# Patient Record
Sex: MALE | Race: WHITE | Employment: UNEMPLOYED | URBAN - METROPOLITAN AREA
[De-identification: names, ages, dates, MRNs, and addresses within clinical notes are randomized per-mention and may not be internally consistent; named-entity substitution may affect disease eponyms.]

---

## 2021-03-10 ENCOUNTER — HOSPITAL ENCOUNTER (OUTPATIENT)
Age: 31
Setting detail: OBSERVATION
Discharge: HOME | End: 2021-03-11
Attending: EMERGENCY MEDICINE | Admitting: EMERGENCY MEDICINE
Payer: COMMERCIAL

## 2021-03-10 DIAGNOSIS — K85.90 ACUTE PANCREATITIS WITHOUT INFECTION OR NECROSIS, UNSPECIFIED PANCREATITIS TYPE: Primary | ICD-10-CM

## 2021-03-10 LAB
ABSOLUTE EOS #: 0 K/UL (ref 0–0.4)
ABSOLUTE IMMATURE GRANULOCYTE: 0.09 K/UL (ref 0–0.3)
ABSOLUTE LYMPH #: 3.51 K/UL (ref 1–4.8)
ABSOLUTE MONO #: 0.63 K/UL (ref 0.1–0.8)
ANION GAP SERPL CALCULATED.3IONS-SCNC: 10 MMOL/L (ref 9–17)
BASOPHILS # BLD: 2 % (ref 0–2)
BASOPHILS ABSOLUTE: 0.18 K/UL (ref 0–0.2)
BUN BLDV-MCNC: 12 MG/DL (ref 6–20)
BUN/CREAT BLD: ABNORMAL (ref 9–20)
CALCIUM SERPL-MCNC: 8.5 MG/DL (ref 8.6–10.4)
CHLORIDE BLD-SCNC: 107 MMOL/L (ref 98–107)
CO2: 24 MMOL/L (ref 20–31)
CREAT SERPL-MCNC: 0.62 MG/DL (ref 0.7–1.2)
DIFFERENTIAL TYPE: ABNORMAL
EOSINOPHILS RELATIVE PERCENT: 0 % (ref 1–4)
GFR AFRICAN AMERICAN: >60 ML/MIN
GFR NON-AFRICAN AMERICAN: >60 ML/MIN
GFR SERPL CREATININE-BSD FRML MDRD: ABNORMAL ML/MIN/{1.73_M2}
GFR SERPL CREATININE-BSD FRML MDRD: ABNORMAL ML/MIN/{1.73_M2}
GLUCOSE BLD-MCNC: 85 MG/DL (ref 70–99)
HCT VFR BLD CALC: 36 % (ref 40.7–50.3)
HEMOGLOBIN: 11.2 G/DL (ref 13–17)
IMMATURE GRANULOCYTES: 1 %
LIPASE: 183 U/L (ref 13–60)
LYMPHOCYTES # BLD: 39 % (ref 24–44)
MCH RBC QN AUTO: 19.8 PG (ref 25.2–33.5)
MCHC RBC AUTO-ENTMCNC: 31.1 G/DL (ref 28.4–34.8)
MCV RBC AUTO: 63.5 FL (ref 82.6–102.9)
MONOCYTES # BLD: 7 % (ref 1–7)
MORPHOLOGY: ABNORMAL
NRBC AUTOMATED: 0.2 PER 100 WBC
PDW BLD-RTO: 19.4 % (ref 11.8–14.4)
PLATELET # BLD: ABNORMAL K/UL (ref 138–453)
PLATELET ESTIMATE: ABNORMAL
PLATELET, FLUORESCENCE: 307 K/UL (ref 138–453)
PLATELET, IMMATURE FRACTION: 7.6 % (ref 1.1–10.3)
PMV BLD AUTO: ABNORMAL FL (ref 8.1–13.5)
POTASSIUM SERPL-SCNC: 3.8 MMOL/L (ref 3.7–5.3)
RBC # BLD: 5.67 M/UL (ref 4.21–5.77)
RBC # BLD: ABNORMAL 10*6/UL
SEG NEUTROPHILS: 51 % (ref 36–66)
SEGMENTED NEUTROPHILS ABSOLUTE COUNT: 4.59 K/UL (ref 1.8–7.7)
SODIUM BLD-SCNC: 141 MMOL/L (ref 135–144)
WBC # BLD: 9 K/UL (ref 3.5–11.3)
WBC # BLD: ABNORMAL 10*3/UL

## 2021-03-10 PROCEDURE — 85055 RETICULATED PLATELET ASSAY: CPT

## 2021-03-10 PROCEDURE — 83690 ASSAY OF LIPASE: CPT

## 2021-03-10 PROCEDURE — 6360000002 HC RX W HCPCS: Performed by: STUDENT IN AN ORGANIZED HEALTH CARE EDUCATION/TRAINING PROGRAM

## 2021-03-10 PROCEDURE — 2580000003 HC RX 258: Performed by: STUDENT IN AN ORGANIZED HEALTH CARE EDUCATION/TRAINING PROGRAM

## 2021-03-10 PROCEDURE — 85025 COMPLETE CBC W/AUTO DIFF WBC: CPT

## 2021-03-10 PROCEDURE — 96374 THER/PROPH/DIAG INJ IV PUSH: CPT

## 2021-03-10 PROCEDURE — 80048 BASIC METABOLIC PNL TOTAL CA: CPT

## 2021-03-10 PROCEDURE — 96375 TX/PRO/DX INJ NEW DRUG ADDON: CPT

## 2021-03-10 PROCEDURE — 99283 EMERGENCY DEPT VISIT LOW MDM: CPT

## 2021-03-10 RX ORDER — KETOROLAC TROMETHAMINE 30 MG/ML
30 INJECTION, SOLUTION INTRAMUSCULAR; INTRAVENOUS ONCE
Status: COMPLETED | OUTPATIENT
Start: 2021-03-10 | End: 2021-03-10

## 2021-03-10 RX ORDER — 0.9 % SODIUM CHLORIDE 0.9 %
1000 INTRAVENOUS SOLUTION INTRAVENOUS ONCE
Status: COMPLETED | OUTPATIENT
Start: 2021-03-10 | End: 2021-03-10

## 2021-03-10 RX ORDER — 0.9 % SODIUM CHLORIDE 0.9 %
1000 INTRAVENOUS SOLUTION INTRAVENOUS ONCE
Status: COMPLETED | OUTPATIENT
Start: 2021-03-10 | End: 2021-03-11

## 2021-03-10 RX ORDER — ONDANSETRON 2 MG/ML
4 INJECTION INTRAMUSCULAR; INTRAVENOUS ONCE
Status: COMPLETED | OUTPATIENT
Start: 2021-03-10 | End: 2021-03-10

## 2021-03-10 RX ADMIN — SODIUM CHLORIDE 1000 ML: 9 INJECTION, SOLUTION INTRAVENOUS at 22:26

## 2021-03-10 RX ADMIN — SODIUM CHLORIDE 1000 ML: 9 INJECTION, SOLUTION INTRAVENOUS at 23:59

## 2021-03-10 RX ADMIN — ONDANSETRON 4 MG: 2 INJECTION INTRAMUSCULAR; INTRAVENOUS at 22:19

## 2021-03-10 RX ADMIN — KETOROLAC TROMETHAMINE 30 MG: 30 INJECTION, SOLUTION INTRAMUSCULAR; INTRAVENOUS at 22:19

## 2021-03-10 ASSESSMENT — PAIN SCALES - GENERAL: PAINLEVEL_OUTOF10: 7

## 2021-03-11 VITALS
TEMPERATURE: 98.4 F | RESPIRATION RATE: 16 BRPM | WEIGHT: 180 LBS | HEART RATE: 59 BPM | OXYGEN SATURATION: 100 % | SYSTOLIC BLOOD PRESSURE: 103 MMHG | DIASTOLIC BLOOD PRESSURE: 64 MMHG | BODY MASS INDEX: 23.86 KG/M2 | HEIGHT: 73 IN

## 2021-03-11 LAB
ABSOLUTE EOS #: 0.17 K/UL (ref 0–0.44)
ABSOLUTE IMMATURE GRANULOCYTE: 0.17 K/UL (ref 0–0.3)
ABSOLUTE LYMPH #: 2.44 K/UL (ref 1.1–3.7)
ABSOLUTE MONO #: 0.87 K/UL (ref 0.1–1.2)
ALBUMIN SERPL-MCNC: 3.7 G/DL (ref 3.5–5.2)
ALBUMIN SERPL-MCNC: 4.3 G/DL (ref 3.5–5.2)
ALBUMIN/GLOBULIN RATIO: 1.7 (ref 1–2.5)
ALBUMIN/GLOBULIN RATIO: 1.7 (ref 1–2.5)
ALP BLD-CCNC: 56 U/L (ref 40–129)
ALP BLD-CCNC: 67 U/L (ref 40–129)
ALT SERPL-CCNC: 60 U/L (ref 5–41)
ALT SERPL-CCNC: 70 U/L (ref 5–41)
ANION GAP SERPL CALCULATED.3IONS-SCNC: 9 MMOL/L (ref 9–17)
AST SERPL-CCNC: 86 U/L
AST SERPL-CCNC: 88 U/L
BASOPHILS # BLD: 1 % (ref 0–2)
BASOPHILS ABSOLUTE: 0.09 K/UL (ref 0–0.2)
BILIRUB SERPL-MCNC: 0.5 MG/DL (ref 0.3–1.2)
BILIRUB SERPL-MCNC: 1.07 MG/DL (ref 0.3–1.2)
BILIRUBIN DIRECT: 0.15 MG/DL
BILIRUBIN, INDIRECT: 0.35 MG/DL (ref 0–1)
BUN BLDV-MCNC: 10 MG/DL (ref 6–20)
BUN/CREAT BLD: ABNORMAL (ref 9–20)
CALCIUM SERPL-MCNC: 8.6 MG/DL (ref 8.6–10.4)
CHLORIDE BLD-SCNC: 105 MMOL/L (ref 98–107)
CO2: 22 MMOL/L (ref 20–31)
CREAT SERPL-MCNC: 0.58 MG/DL (ref 0.7–1.2)
DIFFERENTIAL TYPE: ABNORMAL
EOSINOPHILS RELATIVE PERCENT: 2 % (ref 1–4)
GFR AFRICAN AMERICAN: >60 ML/MIN
GFR NON-AFRICAN AMERICAN: >60 ML/MIN
GFR SERPL CREATININE-BSD FRML MDRD: ABNORMAL ML/MIN/{1.73_M2}
GFR SERPL CREATININE-BSD FRML MDRD: ABNORMAL ML/MIN/{1.73_M2}
GLOBULIN: ABNORMAL G/DL (ref 1.5–3.8)
GLUCOSE BLD-MCNC: 110 MG/DL (ref 70–99)
HCT VFR BLD CALC: 39.5 % (ref 40.7–50.3)
HEMOGLOBIN: 12 G/DL (ref 13–17)
IMMATURE GRANULOCYTES: 2 %
LIPASE: 36 U/L (ref 13–60)
LYMPHOCYTES # BLD: 28 % (ref 24–43)
MCH RBC QN AUTO: 19.4 PG (ref 25.2–33.5)
MCHC RBC AUTO-ENTMCNC: 30.4 G/DL (ref 28.4–34.8)
MCV RBC AUTO: 64 FL (ref 82.6–102.9)
MONOCYTES # BLD: 10 % (ref 3–12)
MORPHOLOGY: ABNORMAL
NRBC AUTOMATED: 0 PER 100 WBC
PDW BLD-RTO: 20.3 % (ref 11.8–14.4)
PLATELET # BLD: ABNORMAL K/UL (ref 138–453)
PLATELET ESTIMATE: ABNORMAL
PLATELET, FLUORESCENCE: 300 K/UL (ref 138–453)
PLATELET, IMMATURE FRACTION: 7.2 % (ref 1.1–10.3)
PMV BLD AUTO: ABNORMAL FL (ref 8.1–13.5)
POTASSIUM SERPL-SCNC: 3.9 MMOL/L (ref 3.7–5.3)
RBC # BLD: 6.17 M/UL (ref 4.21–5.77)
RBC # BLD: ABNORMAL 10*6/UL
SEG NEUTROPHILS: 57 % (ref 36–65)
SEGMENTED NEUTROPHILS ABSOLUTE COUNT: 4.96 K/UL (ref 1.5–8.1)
SODIUM BLD-SCNC: 136 MMOL/L (ref 135–144)
TOTAL PROTEIN: 5.9 G/DL (ref 6.4–8.3)
TOTAL PROTEIN: 6.9 G/DL (ref 6.4–8.3)
WBC # BLD: 8.7 K/UL (ref 3.5–11.3)
WBC # BLD: ABNORMAL 10*3/UL

## 2021-03-11 PROCEDURE — 2580000003 HC RX 258: Performed by: STUDENT IN AN ORGANIZED HEALTH CARE EDUCATION/TRAINING PROGRAM

## 2021-03-11 PROCEDURE — 83690 ASSAY OF LIPASE: CPT

## 2021-03-11 PROCEDURE — 36415 COLL VENOUS BLD VENIPUNCTURE: CPT

## 2021-03-11 PROCEDURE — 6360000002 HC RX W HCPCS: Performed by: STUDENT IN AN ORGANIZED HEALTH CARE EDUCATION/TRAINING PROGRAM

## 2021-03-11 PROCEDURE — 80053 COMPREHEN METABOLIC PANEL: CPT

## 2021-03-11 PROCEDURE — 85055 RETICULATED PLATELET ASSAY: CPT

## 2021-03-11 PROCEDURE — 80076 HEPATIC FUNCTION PANEL: CPT

## 2021-03-11 PROCEDURE — G0378 HOSPITAL OBSERVATION PER HR: HCPCS

## 2021-03-11 PROCEDURE — 96376 TX/PRO/DX INJ SAME DRUG ADON: CPT

## 2021-03-11 PROCEDURE — 85025 COMPLETE CBC W/AUTO DIFF WBC: CPT

## 2021-03-11 RX ORDER — OXYCODONE HYDROCHLORIDE 5 MG/1
10 TABLET ORAL EVERY 6 HOURS PRN
Status: DISCONTINUED | OUTPATIENT
Start: 2021-03-11 | End: 2021-03-11 | Stop reason: HOSPADM

## 2021-03-11 RX ORDER — ONDANSETRON 4 MG/1
4 TABLET, ORALLY DISINTEGRATING ORAL EVERY 8 HOURS PRN
Qty: 15 TABLET | Refills: 0 | Status: SHIPPED | OUTPATIENT
Start: 2021-03-11 | End: 2022-01-12 | Stop reason: SDUPTHER

## 2021-03-11 RX ORDER — ONDANSETRON 2 MG/ML
4 INJECTION INTRAMUSCULAR; INTRAVENOUS EVERY 6 HOURS PRN
Status: DISCONTINUED | OUTPATIENT
Start: 2021-03-11 | End: 2021-03-11 | Stop reason: HOSPADM

## 2021-03-11 RX ORDER — MORPHINE SULFATE 2 MG/ML
2 INJECTION, SOLUTION INTRAMUSCULAR; INTRAVENOUS EVERY 4 HOURS PRN
Status: DISCONTINUED | OUTPATIENT
Start: 2021-03-11 | End: 2021-03-11 | Stop reason: HOSPADM

## 2021-03-11 RX ORDER — HYDROCODONE BITARTRATE AND ACETAMINOPHEN 5; 325 MG/1; MG/1
1 TABLET ORAL EVERY 6 HOURS PRN
Qty: 10 TABLET | Refills: 0 | Status: SHIPPED | OUTPATIENT
Start: 2021-03-11 | End: 2021-03-14

## 2021-03-11 RX ORDER — SODIUM CHLORIDE 0.9 % (FLUSH) 0.9 %
10 SYRINGE (ML) INJECTION EVERY 12 HOURS SCHEDULED
Status: DISCONTINUED | OUTPATIENT
Start: 2021-03-11 | End: 2021-03-11 | Stop reason: HOSPADM

## 2021-03-11 RX ORDER — OXYCODONE HYDROCHLORIDE 5 MG/1
5 TABLET ORAL EVERY 6 HOURS PRN
Status: DISCONTINUED | OUTPATIENT
Start: 2021-03-11 | End: 2021-03-11 | Stop reason: HOSPADM

## 2021-03-11 RX ORDER — SODIUM CHLORIDE 0.9 % (FLUSH) 0.9 %
10 SYRINGE (ML) INJECTION PRN
Status: DISCONTINUED | OUTPATIENT
Start: 2021-03-11 | End: 2021-03-11 | Stop reason: HOSPADM

## 2021-03-11 RX ORDER — ACETAMINOPHEN 500 MG
1000 TABLET ORAL EVERY 8 HOURS SCHEDULED
Status: DISCONTINUED | OUTPATIENT
Start: 2021-03-11 | End: 2021-03-11 | Stop reason: HOSPADM

## 2021-03-11 RX ADMIN — ONDANSETRON 4 MG: 2 INJECTION INTRAMUSCULAR; INTRAVENOUS at 14:33

## 2021-03-11 RX ADMIN — SODIUM CHLORIDE, POTASSIUM CHLORIDE, SODIUM LACTATE AND CALCIUM CHLORIDE: 600; 310; 30; 20 INJECTION, SOLUTION INTRAVENOUS at 01:24

## 2021-03-11 RX ADMIN — ONDANSETRON 4 MG: 2 INJECTION INTRAMUSCULAR; INTRAVENOUS at 07:30

## 2021-03-11 ASSESSMENT — ENCOUNTER SYMPTOMS
VOMITING: 1
CONSTIPATION: 0
NAUSEA: 1
BACK PAIN: 0
ABDOMINAL PAIN: 0
SORE THROAT: 0
RHINORRHEA: 0
COUGH: 0
SHORTNESS OF BREATH: 0
DIARRHEA: 0

## 2021-03-11 ASSESSMENT — PAIN SCALES - GENERAL: PAINLEVEL_OUTOF10: 0

## 2021-03-11 NOTE — PROGRESS NOTES
[address]  CDU / OBSERVATION ENCOUNTER  ATTENDING NOTE       I performed a history and physical examination of the patient and discussed management with the resident or midlevel provider. I reviewed the resident or midlevel provider's note and agree with the documented findings and plan of care. Any areas of disagreement are noted on the chart. I was personally present for the key portions of any procedures. I have documented in the chart those procedures where I was not present during the key portions. I have reviewed the nurses notes. I agree with the chief complaint, past medical history, past surgical history, allergies, medications, social and family history as documented unless otherwise noted below. The Family history, social history, and ROS are effectively unchanged since admission unless noted elsewhere in the chart. Patient improving after mild pancreatitis secondary to alcohol intake. Patient was drinking several nights ago and then began having symptoms. Patient with elevated lipase. Advancing diet slowly today. Will reassess.     Tracie Yin MD  Attending Emergency  Physician

## 2021-03-11 NOTE — H&P
[address]  CDU / OBSERVATION ENCOUNTER  RESIDENT NOTE     Pt Name: Zack Landaverde  MRN: [id number]  [place] 1990  Date of evaluation: 3/11/21  Patient's PCP is : No primary care provider on file. CHIEF COMPLAINT       Chief Complaint   Patient presents with    Nausea     HISTORY OF PRESENT ILLNESS    Zack Landaverde is a 31 y.o. male who presents vomiting and diarrhea x3 days. Lipase 183. He was admitted to observation for symptomatic control. No history of pancreatitis previously. No history of gallstones. Patient did report one night of heavy drinking prior to symptom onset but denies daily alcohol use. Has been able to tolerate fluids overnight. States that diarrhea has resolved this morning. Location/Symptom: Vomiting  Timing/Onset: 3 days ago  Provocation: N/A  Quality: Nonbloody nonbilious  Radiation: N/A  Severity: N/A  Timing/Duration: Improving  Modifying Factors: Antiemetics    REVIEW OF SYSTEMS       General ROS - No fevers, No malaise   Ophthalmic ROS - No discharge, No changes in vision  ENT ROS -  No sore throat, No rhinorrhea,   Respiratory ROS - no shortness of breath, no cough, no  wheezing  Cardiovascular ROS - No chest pain, no dyspnea on exertion  Gastrointestinal ROS -positive for abdominal pain and diarrhea and vomiting  Genito-Urinary ROS - No dysuria, trouble voiding, or hematuria  Musculoskeletal ROS - No myalgias, No arthalgias  Neurological ROS - No headache, no dizziness/lightheadedness, No focal weakness, no loss of sensation  Dermatological ROS - No lesions, No rash     (PQRS) Advance directives on face sheet per hospital policy. No change unless specifically mentioned in chart    PAST MEDICAL HISTORY    has no past medical history on file. I have reviewed the past medical history with the patient and it is not pertinent to this complaint. SURGICAL HISTORY      has no past surgical history on file.   I have reviewed and agree with Surgical History entered and it is not pertinent to this complaint. CURRENT MEDICATIONS     sodium chloride flush 0.9 % injection 10 mL, 2 times per day  sodium chloride flush 0.9 % injection 10 mL, PRN  enoxaparin (LOVENOX) injection 40 mg, Daily  acetaminophen (TYLENOL) tablet 1,000 mg, 3 times per day  morphine (PF) injection 2 mg, Q4H PRN  oxyCODONE (ROXICODONE) immediate release tablet 5 mg, Q6H PRN    Or  oxyCODONE (ROXICODONE) immediate release tablet 10 mg, Q6H PRN  ondansetron (ZOFRAN) injection 4 mg, Q6H PRN  lactated ringers 1,000 mL infusion, Continuous        All medication charted and reviewed. ALLERGIES     has No Known Allergies. FAMILY HISTORY     has no family status information on file. family history is not on file. I have reviewed and agree with the family history entered. I have reviewed the Family History and it is not significant to the case    SOCIAL HISTORY      reports that he has never smoked. He has never used smokeless tobacco.  I have reviewed and agree with all Social.  There are no concerns for substance abuse/use. PHYSICAL EXAM     INITIAL VITALS:  height is 6' 1\" (1.854 m) and weight is 180 lb (81.6 kg). His oral temperature is 98.4 °F (36.9 °C). His blood pressure is 103/64 and his pulse is 59. His respiration is 16 and oxygen saturation is 100%.       CONSTITUTIONAL: AOx4, no apparent distress, appears stated age    HEAD: normocephalic, atraumatic   EYES: PERRLA, EOMI    ENT: moist mucous membranes, uvula midline   NECK: supple, symmetric   BACK: symmetric   LUNGS: clear to auscultation bilaterally   CARDIOVASCULAR: regular rate and rhythm, no murmurs, rubs or gallops   ABDOMEN: soft, non-tender, non-distended with normal active bowel sounds   NEUROLOGIC:  MAEx4, no focal sensory or motor deficits   MUSCULOSKELETAL: no clubbing, cyanosis or edema   SKIN: no rash or wounds       DIAGNOSTIC RESULTS     RADIOLOGY:   I directly visualized the following  images and reviewed the radiologist interpretations:    No results found. LABS:  I have reviewed and interpreted all available lab results. Labs Reviewed   BASIC METABOLIC PANEL W/ REFLEX TO MG FOR LOW K - Abnormal; Notable for the following components:       Result Value    CREATININE 0.62 (*)     Calcium 8.5 (*)     All other components within normal limits   CBC WITH AUTO DIFFERENTIAL - Abnormal; Notable for the following components:    Hemoglobin 11.2 (*)     Hematocrit 36.0 (*)     MCV 63.5 (*)     MCH 19.8 (*)     RDW 19.4 (*)     NRBC Automated 0.2 (*)     Immature Granulocytes 1 (*)     Eosinophils % 0 (*)     All other components within normal limits   LIPASE - Abnormal; Notable for the following components:    Lipase 183 (*)     All other components within normal limits   HEPATIC FUNCTION PANEL - Abnormal; Notable for the following components:    ALT 60 (*)     AST 86 (*)     Total Protein 5.9 (*)     All other components within normal limits   IMMATURE PLATELET FRACTION   COMPREHENSIVE METABOLIC PANEL W/ REFLEX TO MG FOR LOW K   LIPASE   CBC WITH AUTO DIFFERENTIAL       CDU IMPRESSION / PLAN      Quan Jimenez is a 31 y.o. male who presents with :    Pancreatitis:  -Analgesics, fluids, advance diet as tolerated    · Continue home medications and pain control  · Monitor vitals, labs, and imaging  · DISPO: pending consults and clinical improvement    CONSULTS:    None    PROCEDURES:  Not indicated       PATIENT REFERRED TO:    No follow-up provider specified. --  Juice Laureano DO   Emergency Medicine Resident     This dictation was generated by voice recognition computer software. Although all attempts are made to edit the dictation for accuracy, there may be errors in the transcription that are not intended.

## 2021-03-11 NOTE — ED PROVIDER NOTES
[address] [facility]                                      Emergency Department                                      Faculty Attestation                                         I performed a history and physical examination of the patient and discussed management with the resident. I reviewed the residents note and agree with the documented findings and plan of care. Any areas of disagreement are noted on the chart. I was personally present for the key portions of any procedures. I have documented in the chart those procedures where I was not present during the key portions. I agree with the chief complaint, past medical history, past surgical history, allergies, medications, social and family history as documented unless otherwise noted below. For mid-level providers such as nurse practitioners as well as physicians assistants:    I have personally seen and evaluated the patient. I find the patient's history and physical exam are consistent with NP/PA documentation. I agree with the care provided, treatment rendered, disposition, & follow-up plan. Additional findings are as noted  79-year-old who is vomited the last 2 mornings upon awakening. Is able to eat later. Is stooling. No pain.   Just the nausea and vomiting first thing in the morning     Unremarkable physical exam     Villa Christina DO  03/10/21 9268

## 2021-03-11 NOTE — ED NOTES
ED to inpatient nurses report    Chief Complaint   Patient presents with    Nausea      Present to ED from Home  LOC: Alert and oriented x4   Vital signs   Vitals:    03/10/21 2124 03/10/21 2127   BP: (!) 107/56    Pulse: 88    Resp: 16    Temp:  98.4 °F (36.9 °C)   TempSrc:  Temporal   SpO2: 96%    Weight: 180 lb (81.6 kg)    Height: 6' 1\" (1.854 m)       Oxygen Baseline 98%    Current needs required RA  LDAs:   Peripheral IV 03/10/21 Left Forearm (Active)     Mobility: Ambulatory   Pending ED orders: N/A  Present condition: Stable  Code Status: [unfilled]  Full code  Consults:  []  Hospitalist  Completed  [] yes [] no  []  Medicine  Completed  [] yes [] No  []  Cardiology  Completed  [] yes [] No  []  GI   Completed  [] yes [x] No  []  Neurology  Completed  [] yes [] No  []  Nephrology Completed  [] yes [] No  []  Vascular  Completed  [] yes [] No   []  Surgery  Completed  [] yes [] No   []  Urology  Completed  [] yes [] No   []  Plastics  Completed  [] yes [] No   []  ENT  Completed  [] yes [] No   []  Other   Completed  [] yes [] No  Pertinent event(s) No current compliaints  Pertinent event(s)   Electronically signed by Dana Givens RN on 3/11/2021 at 12:41 AM       Dana Givens RN  03/11/21 4545

## 2021-03-11 NOTE — DISCHARGE SUMMARY
CDU Discharge Summary        Patient:  Nayana Wooten  YOB: 1990    MRN: [id number]   Acct: [de-identified]    Primary Care Physician: No primary care provider on file. Admit date:  3/10/2021  9:43 PM  Discharge date: 3/11/2021    Discharge Diagnoses:     Acute pancreatitis due to unspecified etiology  Improved with antiemetics, analgesics and fluids    Follow-up:  Call today/tomorrow for a follow up appointment with No primary care provider on file. , or return to the Emergency Room with worsening symptoms    Stressed to patient the importance of following up with primary care doctor for further workup/management of symptoms. Pt verbalizes understanding and agrees with plan. Discharge Medications:  Changes to medications: Norco and Zofran as needed       There are no discharge medications for this patient.        Diet:  DIET GENERAL; , Advance as tolerated     Activity:  As tolerated    Consultants: None    Procedures:  Not indicated     Diagnostic Test:   Results for orders placed or performed during the hospital encounter of 92/02/37   Basic Metabolic Panel w/ Reflex to MG   Result Value Ref Range    Glucose 85 70 - 99 mg/dL    BUN 12 6 - 20 mg/dL    CREATININE 0.62 (L) 0.70 - 1.20 mg/dL    Bun/Cre Ratio NOT REPORTED 9 - 20    Calcium 8.5 (L) 8.6 - 10.4 mg/dL    Sodium 141 135 - 144 mmol/L    Potassium 3.8 3.7 - 5.3 mmol/L    Chloride 107 98 - 107 mmol/L    CO2 24 20 - 31 mmol/L    Anion Gap 10 9 - 17 mmol/L    GFR Non-African American >60 >60 mL/min    GFR African American >60 >60 mL/min    GFR Comment          GFR Staging NOT REPORTED    CBC Auto Differential   Result Value Ref Range    WBC 9.0 3.5 - 11.3 k/uL    RBC 5.67 4.21 - 5.77 m/uL    Hemoglobin 11.2 (L) 13.0 - 17.0 g/dL    Hematocrit 36.0 (L) 40.7 - 50.3 %    MCV 63.5 (L) 82.6 - 102.9 fL    MCH 19.8 (L) 25.2 - 33.5 pg    MCHC 31.1 28.4 - 34.8 g/dL    RDW 19.4 (H) 11.8 - 14.4 %    Platelets See Reflexed IPF Result 138 - 453 k/uL    MPV NOT REPORTED 8.1 - 13.5 fL    NRBC Automated 0.2 (H) 0.0 per 100 WBC    Differential Type NOT REPORTED     WBC Morphology NOT REPORTED     RBC Morphology NOT REPORTED     Platelet Estimate NOT REPORTED     Immature Granulocytes 1 (H) 0 %    Seg Neutrophils 51 36 - 66 %    Lymphocytes 39 24 - 44 %    Monocytes 7 1 - 7 %    Eosinophils % 0 (L) 1 - 4 %    Basophils 2 0 - 2 %    Absolute Immature Granulocyte 0.09 0.00 - 0.30 k/uL    Segs Absolute 4.59 1.8 - 7.7 k/uL    Absolute Lymph # 3.51 1.0 - 4.8 k/uL    Absolute Mono # 0.63 0.1 - 0.8 k/uL    Absolute Eos # 0.00 0.0 - 0.4 k/uL    Basophils Absolute 0.18 0.0 - 0.2 k/uL    Morphology ANISOCYTOSIS PRESENT     Morphology MICROCYTOSIS PRESENT     Morphology 1+ ELLIPTOCYTES     Morphology 1+ POLYCHROMASIA    Lipase   Result Value Ref Range    Lipase 183 (H) 13 - 60 U/L   Immature Platelet Fraction   Result Value Ref Range    Platelet, Immature Fraction 7.6 1.1 - 10.3 %    Platelet, Fluorescence 307 138 - 453 k/uL   HEPATIC FUNCTION PANEL   Result Value Ref Range    Albumin 3.7 3.5 - 5.2 g/dL    Alkaline Phosphatase 56 40 - 129 U/L    ALT 60 (H) 5 - 41 U/L    AST 86 (H) <40 U/L    Total Bilirubin 0.50 0.3 - 1.2 mg/dL    Bilirubin, Direct 0.15 <0.31 mg/dL    Bilirubin, Indirect 0.35 0.00 - 1.00 mg/dL    Total Protein 5.9 (L) 6.4 - 8.3 g/dL    Globulin NOT REPORTED 1.5 - 3.8 g/dL    Albumin/Globulin Ratio 1.7 1.0 - 2.5   Comprehensive Metabolic Panel w/ Reflex to MG   Result Value Ref Range    Glucose 110 (H) 70 - 99 mg/dL    BUN 10 6 - 20 mg/dL    CREATININE 0.58 (L) 0.70 - 1.20 mg/dL    Bun/Cre Ratio NOT REPORTED 9 - 20    Calcium 8.6 8.6 - 10.4 mg/dL    Sodium 136 135 - 144 mmol/L    Potassium 3.9 3.7 - 5.3 mmol/L    Chloride 105 98 - 107 mmol/L    CO2 22 20 - 31 mmol/L    Anion Gap 9 9 - 17 mmol/L    Alkaline Phosphatase 67 40 - 129 U/L    ALT 70 (H) 5 - 41 U/L    AST 88 (H) <40 U/L    Total Bilirubin 1.07 0.3 - 1.2 mg/dL    Total Protein 6.9 6.4 - 8.3 g/dL    Albumin

## 2021-03-11 NOTE — ED PROVIDER NOTES
Faculty Sign-Out Attestation  Handoff taken on the following patient from prior Attending Physician: Gustavo Chaudhry    I was available and discussed any additional care issues that arose and coordinated the management plans with the resident(s) caring for the patient during my duty period. Any areas of disagreement with residents documentation of care or procedures are noted on the chart. I was personally present for the key portions of any/all procedures during my duty period. I have documented in the chart those procedures where I was not present during the key portions.     Vomiting, nausea, lab pending, if stable, >> dc    Lorelle Socks, DO  Attending Physician     Lorelle Socks, DO  03/10/21 4576  Intractable vomiting > observation     Lorelle Socks, DO  03/11/21 6449

## 2021-03-11 NOTE — CARE COORDINATION
Case Management Initial Discharge Plan  Cathleen Chau,             Met with:patient to discuss discharge plans. Information verified: address, contacts, phone number, , insurance Yes    Emergency Contact/Next of Kin name & number: Socorro Kingston (mother) [phone]    PCP: No primary care provider on file. Date of last visit: no pcp    Insurance Provider: Emma    Discharge Planning    Living Arrangements:  Other (Comment)   Support Systems:  Parent    Home: [facility]     Patient able to perform ADL's:Independent    Current Services (outpatient & in home) none  DME equipment: none  DME provider:     Receiving oral anticoagulation therapy? No    If indicated:   Physician managing anticoagulation treatment:   Where does patient obtain lab work for ATC treatment? Potential Assistance Needed:  N/A    Patient agreeable to home care: No  [place] of choice provided:  n/a    Prior SNF/Rehab Placement and Facility:   Agreeable to SNF/Rehab: No  [place] of choice provided: n/a     Evaluation: no    Expected Discharge date:  21    Patient expects to be discharged to:  vance to [facility]. Follow Up Appointment: Best Day/ Time:      Transportation provider: walks  Transportation arrangements needed for discharge: No, states he will walk    Readmission Risk              Risk of Unplanned Readmission:        0             Does patient have a readmission risk score greater than 14?: not calculated. If yes, follow-up appointment must be made within 7 days of discharge.      Goals of Care: decreased pain    Discharge Plan: return to home, no skilled needs identified           Electronically signed by Brian Darling RN on 3/11/21 at 8:32 AM EST

## 2021-03-11 NOTE — ED NOTES
Report received from [facility], all questions answered. Pt has no current questions or complaints.      Ayaka Shah RN  03/10/21 3524

## 2021-03-23 ENCOUNTER — HOSPITAL ENCOUNTER (EMERGENCY)
Age: 31
Discharge: HOME | End: 2021-03-23
Attending: EMERGENCY MEDICINE
Payer: COMMERCIAL

## 2021-03-23 VITALS
HEIGHT: 73 IN | SYSTOLIC BLOOD PRESSURE: 104 MMHG | WEIGHT: 180 LBS | HEART RATE: 80 BPM | TEMPERATURE: 97.9 F | RESPIRATION RATE: 18 BRPM | OXYGEN SATURATION: 94 % | BODY MASS INDEX: 23.86 KG/M2 | DIASTOLIC BLOOD PRESSURE: 59 MMHG

## 2021-03-23 DIAGNOSIS — K08.89 PAIN, DENTAL: Primary | ICD-10-CM

## 2021-03-23 PROCEDURE — 99283 EMERGENCY DEPT VISIT LOW MDM: CPT

## 2021-03-23 PROCEDURE — 6370000000 HC RX 637 (ALT 250 FOR IP): Performed by: STUDENT IN AN ORGANIZED HEALTH CARE EDUCATION/TRAINING PROGRAM

## 2021-03-23 RX ORDER — PENICILLIN V POTASSIUM 500 MG/1
500 TABLET ORAL 4 TIMES DAILY
Qty: 28 TABLET | Refills: 0 | Status: SHIPPED | OUTPATIENT
Start: 2021-03-23 | End: 2021-03-30

## 2021-03-23 RX ORDER — PENICILLIN V POTASSIUM 250 MG/1
500 TABLET ORAL ONCE
Status: COMPLETED | OUTPATIENT
Start: 2021-03-23 | End: 2021-03-23

## 2021-03-23 RX ADMIN — PENICILLIN V POTASSIUM 500 MG: 250 TABLET ORAL at 22:52

## 2021-03-23 ASSESSMENT — PAIN DESCRIPTION - PAIN TYPE: TYPE: ACUTE PAIN

## 2021-03-23 ASSESSMENT — PAIN DESCRIPTION - LOCATION: LOCATION: TEETH

## 2021-03-23 ASSESSMENT — PAIN SCALES - GENERAL: PAINLEVEL_OUTOF10: 10

## 2021-03-24 NOTE — ED PROVIDER NOTES
tablet Take 1 tablet by mouth every 8 hours as needed for Nausea 3/11/21   Rebekaha Callvalentina, DO       REVIEW OFSYSTEMS    (2-9 systems for level 4, 10 or more for level 5)      Review of Systems   Constitutional: Negative for chills and fever. HENT: Positive for dental problem (diffuse dental pain from caries). Negative for congestion, ear pain and rhinorrhea. Eyes: Negative for pain and discharge. Respiratory: Negative for cough and shortness of breath. Cardiovascular: Negative for chest pain and palpitations. Gastrointestinal: Negative for abdominal pain, constipation, diarrhea, nausea and vomiting. Genitourinary: Negative for difficulty urinating and hematuria. Musculoskeletal: Negative for arthralgias and myalgias. Skin: Negative for rash and wound. Neurological: Negative for light-headedness and headaches. PHYSICAL EXAM   (up to 7 for level 4, 8 or more forlevel 5)      INITIAL VITALS:   Vitals:    03/23/21 2159   BP: (!) 104/59   Pulse: 80   Resp: 18   Temp:    SpO2: 94%        Physical Exam  Vitals signs and nursing note reviewed. Constitutional:       General: He is not in acute distress. Appearance: He is not ill-appearing. HENT:      Head: Normocephalic and atraumatic. Comments: Diffuse poor dentition noted. No purulent drainage in mouth, no active hematemesis. Multiple areas of caries that appear chronic. No acute trauma, no acute abnormalities. No pain in the submandibular region, tongue nonswollen nontender, no anterior lymphadenopathy. No trismus noted. Nose: Nose normal.      Mouth/Throat:      Mouth: Mucous membranes are moist.      Pharynx: Oropharynx is clear. Eyes:      Pupils: Pupils are equal, round, and reactive to light. Neck:      Musculoskeletal: Normal range of motion. Cardiovascular:      Rate and Rhythm: Normal rate and regular rhythm. Heart sounds: No murmur. No friction rub. No gallop.     Pulmonary:      Effort: Pulmonary effort is normal. No respiratory distress. Breath sounds: Normal breath sounds. No wheezing. Abdominal:      General: Abdomen is flat. There is no distension. Palpations: Abdomen is soft. Tenderness: There is no abdominal tenderness. Skin:     General: Skin is warm and dry. Capillary Refill: Capillary refill takes less than 2 seconds. Neurological:      Mental Status: He is alert and oriented to person, place, and time. Psychiatric:         Mood and Affect: Mood normal.         Behavior: Behavior normal.         DIFFERENTIAL  DIAGNOSIS       DDX: Dental infection versus dental caries versus dental abscess versus poor dentition     Initial MDM/Plan: 31 y.o. male who presents with acute complaint of dental pain. Patient afebrile with vital signs stable upon arrival.  GCS 15, alert, oriented, answering questions appropriately. No recent antibiotics. No allergies to medications. Patient provided with Motrin and penicillin VK here in the emergency department and a written prescription to be taken home, patient instructed to take the prescription for full duration scribe. Patient given contact information to follow-up with [facility]. Patient provided with information to follow-up with [facility] family practice to establish care. Patient discharged in stable condition, all questions answered at this time, patient compliant and understanding of discharge to home. DIAGNOSTIC RESULTS / EMERGENCYDEPARTMENT COURSE / MDM     LABS:  Labs Reviewed - No data to display      RADIOLOGY:  No results found. EKG      All EKG's are interpreted by the Emergency Department Physicianwho either signs or Co-signs this chart in the absence of a cardiologist.    EMERGENCY DEPARTMENT COURSE:          PROCEDURES:  None    CONSULTS:  None      FINAL IMPRESSION      1.  Pain, dental          DISPOSITION / PLAN     DISPOSITION Decision To Discharge 03/23/2021 10:47:11 PM      PATIENT REFERRED TO:  [facility] ED  [address] [facility] [address]  [phone]    As needed, If symptoms worsen    [address]  [address] [facility] [id number] [address]  [address]  [facility] [address] [facility]          DISCHARGE MEDICATIONS:  Discharge Medication List as of 3/23/2021 10:49 PM      START taking these medications    Details   penicillin v potassium (VEETID) 500 MG tablet Take 1 tablet by mouth 4 times daily for 7 days, Disp-28 tablet, R-0Print             Reilly Godinez DO  Emergency Medicine Resident    (Please note that portions of this note were completed with a voice recognition program.Efforts were made to edit the dictations but occasionally words are mis-transcribed.)       Reilly Godinez DO  Resident  03/25/21 2098

## 2021-03-24 NOTE — ED NOTES
Pt arrives to Ed via self. Pt complains of dental pain x7 days. Pt states he thinks he has an infection. Pt is eating a chicken sandwhich with no issues during triage. Pt VSS.       Dory Avila RN  03/23/21 1444

## 2021-03-24 NOTE — ED PROVIDER NOTES
[facility] ED  eMERGENCY dEPARTMENT eNCOUnter   Attending Attestation     Pt Name: Gaurav Madden  MRN: [id number]  [place] 1990  Date of evaluation: 3/23/21       Gaurav Madden is a 31 y.o. male who presents with Dental Pain      History: Pt presents with dental pain. Pt has no other complaints. Exam: No signs of abscess. Multiple dental caries. Plan for antibiotics and discharge to follow with a dentist.     I performed a history and physical examination of the patient and discussed management with the resident. I reviewed the residents note and agree with the documented findings and plan of care. Any areas of disagreement are noted on the chart. I was personally present for the key portions of any procedures. I have documented in the chart those procedures where I was not present during the key portions. I have personally reviewed all images and agree with the resident's interpretation. I have reviewed the emergency nurses triage note. I agree with the chief complaint, past medical history, past surgical history, allergies, medications, social and family history as documented unless otherwise noted below. Documentation of the HPI, Physical Exam and Medical Decision Making performed by medical students or scribes is based on my personal performance of the HPI, PE and MDM. For Phys Assistant/ Nurse Practitioner cases/documentation I have had a face to face evaluation of this patient and have completed at least one if not all key elements of the E/M (history, physical exam, and MDM). Additional findings are as noted. For APC cases I have personally evaluated and examined the patient in conjunction with the APC and agree with the treatment plan and disposition of the patient as recorded by the APC.     Mira Davis MD  Attending Emergency  Physician       Doug Thomas MD  03/23/21 8921

## 2021-03-24 NOTE — FLOWSHEET NOTE
SPIRITUAL CARE DEPARTMENT - [address]  PROGRESS NOTE    Shift date: 3.23.2021  Shift day: Tuesday   Shift # 2    Room # 26/H26A   Name: Luh Carlson            Age: 31 y.o. Gender: male          Orthodoxy: unknown   Place of Gnosticism: unknown    Referral: Routine Visit    Admit Date & Time: 3/23/2021  9:53 PM    PATIENT/EVENT DESCRIPTION:  Luh Carlson is a 31 y.o. male who states that he has a tooth ache      SPIRITUAL ASSESSMENT/INTERVENTION:  Patient appears to be calm and coping but states he's in pain. Has a tooth ache and needs something to help with the pain. No immediate needs at this time.  provided space for patient to express feelings, thoughts, and concerns. Determined support to be available. SPIRITUAL CARE FOLLOW-UP PLAN:  Chaplains will remain available to offer spiritual and emotional support as needed. Electronically signed by Malissa Mccormick on 3/23/2021 at 10:51 PM.  [facility]  [phone]       03/23/21 2250   Encounter Summary   Services provided to: Patient   Referral/Consult From: [facility] Family members   Continue Visiting   ([phone])   Complexity of Encounter Low   Length of Encounter 15 minutes   Spiritual Assessment Completed Yes   Routine   Type Initial   Assessment Calm; Approachable;Coping   Intervention Active listening;Explored feelings, thoughts, concerns; Discussed illness/injury and it's impact   Outcome Expressed feelings/needs/concerns     Electronically signed by Valeriy Moss on 3/23/2021 at 10:51 PM

## 2021-03-25 ASSESSMENT — ENCOUNTER SYMPTOMS
EYE PAIN: 0
DIARRHEA: 0
COUGH: 0
NAUSEA: 0
ABDOMINAL PAIN: 0
CONSTIPATION: 0
EYE DISCHARGE: 0
RHINORRHEA: 0
SHORTNESS OF BREATH: 0
VOMITING: 0

## 2021-05-13 ENCOUNTER — HOSPITAL ENCOUNTER (EMERGENCY)
Age: 31
Discharge: HOME | End: 2021-05-13
Attending: EMERGENCY MEDICINE
Payer: COMMERCIAL

## 2021-05-13 VITALS
DIASTOLIC BLOOD PRESSURE: 64 MMHG | TEMPERATURE: 98.1 F | HEIGHT: 73 IN | RESPIRATION RATE: 16 BRPM | BODY MASS INDEX: 23.86 KG/M2 | WEIGHT: 180 LBS | SYSTOLIC BLOOD PRESSURE: 126 MMHG | HEART RATE: 67 BPM | OXYGEN SATURATION: 99 %

## 2021-05-13 DIAGNOSIS — K08.89 PAIN, DENTAL: Primary | ICD-10-CM

## 2021-05-13 PROCEDURE — 99283 EMERGENCY DEPT VISIT LOW MDM: CPT

## 2021-05-13 PROCEDURE — 6370000000 HC RX 637 (ALT 250 FOR IP): Performed by: STUDENT IN AN ORGANIZED HEALTH CARE EDUCATION/TRAINING PROGRAM

## 2021-05-13 RX ORDER — PENICILLIN V POTASSIUM 250 MG/1
500 TABLET ORAL ONCE
Status: COMPLETED | OUTPATIENT
Start: 2021-05-13 | End: 2021-05-13

## 2021-05-13 RX ORDER — IBUPROFEN 800 MG/1
800 TABLET ORAL EVERY 6 HOURS PRN
Qty: 21 TABLET | Refills: 0 | Status: SHIPPED | OUTPATIENT
Start: 2021-05-13 | End: 2022-01-12 | Stop reason: SDUPTHER

## 2021-05-13 RX ORDER — PENICILLIN V POTASSIUM 500 MG/1
500 TABLET ORAL 4 TIMES DAILY
Qty: 40 TABLET | Refills: 0 | Status: SHIPPED | OUTPATIENT
Start: 2021-05-13

## 2021-05-13 RX ORDER — ACETAMINOPHEN 500 MG
1000 TABLET ORAL 4 TIMES DAILY PRN
Qty: 60 TABLET | Refills: 0 | Status: SHIPPED | OUTPATIENT
Start: 2021-05-13 | End: 2022-01-12 | Stop reason: SDUPTHER

## 2021-05-13 RX ADMIN — PENICILLIN V POTASSIUM 500 MG: 250 TABLET ORAL at 20:33

## 2021-05-13 ASSESSMENT — ENCOUNTER SYMPTOMS
RHINORRHEA: 0
SHORTNESS OF BREATH: 0
VOICE CHANGE: 0
TROUBLE SWALLOWING: 0
NAUSEA: 0
COLOR CHANGE: 0
COUGH: 0
VOMITING: 0

## 2021-05-13 ASSESSMENT — PAIN DESCRIPTION - PAIN TYPE: TYPE: ACUTE PAIN

## 2021-05-13 ASSESSMENT — PAIN SCALES - GENERAL: PAINLEVEL_OUTOF10: 10

## 2021-05-13 ASSESSMENT — PAIN DESCRIPTION - LOCATION: LOCATION: TEETH

## 2021-05-13 NOTE — ED TRIAGE NOTES
Pt ambulated to triage with co dental pain 10/10 on lower left side x 1 month. Pt respirations are even and unlabored, pt is oriented X 4, speaking in complete sentences. Will continue to monitor.

## 2021-05-14 NOTE — ED PROVIDER NOTES
[facility] ED  Emergency Department Encounter  EmergencyMedicine Resident     Pt Name:Jad Lindsey Shown  MRN: [id number]  [place] 1990  Date of evaluation: 5/13/21  PCP:  No primary care provider on file. CHIEF COMPLAINT       Chief Complaint   Patient presents with    Dental Pain       HISTORY OF PRESENT ILLNESS  (Location/Symptom, Timing/Onset, Context/Setting, Quality, Duration, Modifying Factors, Severity.)      Preston Yan is a 31 y.o. male who presents with dental pain ongoing intermittently for the past month. Patient states he has not seen test approximately 2 years, since he was incarcerated. Patient states his symptoms are ongoing, intermittent, and sharp. Patient is tolerating p.o. without difficulty. He has been intermittently taking Tylenol and Motrin without significant improvement. Patient concerned about infection and wants dental follow-up. PAST MEDICAL / SURGICAL / SOCIAL / FAMILY HISTORY      has no past medical history on file. has no past surgical history on file.       Social History     Socioeconomic History    Marital status: Single     Spouse name: Not on file    Number of children: Not on file    Years of education: Not on file    Highest education level: Not on file   Occupational History    Not on file   Social Needs    Financial resource strain: Not on file    Food insecurity     Worry: Not on file     Inability: Not on file    Transportation needs     Medical: Not on file     Non-medical: Not on file   Tobacco Use    Smoking status: Never Smoker    Smokeless tobacco: Never Used   Substance and Sexual Activity    Alcohol use: Not on file    Drug use: Not on file    Sexual activity: Not on file   Lifestyle    Physical activity     Days per week: Not on file     Minutes per session: Not on file    Stress: Not on file   Relationships    Social connections     Talks on phone: Not on file     Gets together: Not on file     Attends encounter. MEDICATIONS ORDERED:  Orders Placed This Encounter   Medications    penicillin v potassium (VEETID) tablet 500 mg    penicillin v potassium (VEETID) 500 MG tablet     Sig: Take 1 tablet by mouth 4 times daily     Dispense:  40 tablet     Refill:  0    acetaminophen (TYLENOL) 500 MG tablet     Sig: Take 2 tablets by mouth 4 times daily as needed for Pain     Dispense:  60 tablet     Refill:  0    ibuprofen (IBU) 800 MG tablet     Sig: Take 1 tablet by mouth every 6 hours as needed for Pain     Dispense:  21 tablet     Refill:  0    benzocaine (ORAJEL) 10 % mucosal gel     Sig: Take by mouth as needed. Dispense:  1 Tube     Refill:  0       DDX: Dental pain, dental caries    MDM/IMPRESSION: Is a 65-year-old male presenting with dental pain ongoing intermittently for the past month. Will place on more regimented pain regimen with Tylenol Motrin scheduled, as well as penicillin. Follow-up with dental clinic provided. Will discharge. DIAGNOSTIC RESULTS / EMERGENCY DEPARTMENT COURSE / MDM   LAB RESULTS:  No results found for this visit on 05/13/21. RADIOLOGY:  No orders to display        EKG      All EKG's are interpreted by the Emergency Department Physician who either signs or Co-signs this chart in the absence of a cardiologist.    EMERGENCY DEPARTMENT COURSE:    Patient provided penicillin here as he is already taken Tylenol Motrin. Will discharge with prescriptions for Tylenol, Motrin, and penicillin. PROCEDURES:      CONSULTS:  None    CRITICAL CARE:      FINAL IMPRESSION      1.  Pain, dental          DISPOSITION / PLAN     DISPOSITION Decision To Discharge 05/13/2021 08:27:00 PM      PATIENT REFERRED TO:  [facility]  [facility]  [phone]  Go to   If symptoms worsen      DISCHARGE MEDICATIONS:  New Prescriptions    ACETAMINOPHEN (TYLENOL) 500 MG TABLET    Take 2 tablets by mouth 4 times daily as needed for Pain    BENZOCAINE (ORAJEL) 10 % MUCOSAL GEL    Take by mouth as needed.     IBUPROFEN (IBU) 800 MG TABLET    Take 1 tablet by mouth every 6 hours as needed for Pain    PENICILLIN V POTASSIUM (VEETID) 500 MG TABLET    Take 1 tablet by mouth 4 times daily       Arlean Schilder, DO  Emergency Medicine Resident    (Please note that portions of thisnote were completed with a voice recognition program.  Efforts were made to edit the dictations but occasionally words are mis-transcribed.)      Arlean Schilder, DO  Resident  05/13/21 2031

## 2021-05-14 NOTE — ED NOTES
[facility]  [address] [facility]  Phone: [phone]  Fax: [phone]    Patient Referral Fax     To:  Patient Referral Coordinator Fax:  [phone]  Referral from:  [address] [place] [address]  31 y.o.      [phone] (home)      Additional Notes: pt will call and make appt    Signature: Staci Gale Date: 5/13/21         Shagufta Ramos RN  05/13/21 2035

## 2021-05-14 NOTE — ED PROVIDER NOTES
[facility] ED     Emergency Department     Faculty Attestation    I performed a history and physical examination of the patient and discussed management with the resident. I reviewed the residents note and agree with the documented findings and plan of care. Any areas of disagreement are noted on the chart. I was personally present for the key portions of any procedures. I have documented in the chart those procedures where I was not present during the key portions. I have reviewed the emergency nurses triage note. I agree with the chief complaint, past medical history, past surgical history, allergies, medications, social and family history as documented unless otherwise noted below. For Physician Assistant/ Nurse Practitioner cases/documentation I have personally evaluated this patient and have completed at least one if not all key elements of the E/M (history, physical exam, and MDM). Additional findings are as noted. Patient presents with dental pain. He says this has been going on for several weeks but worsened over the past 2 days. He says he has multiple teeth that have been bothering him. He denies fever, nausea or vomiting. He denies difficulty breathing or swallowing. On exam, patient is sitting in a chair and appears well. Patient does have poor dentition but no dental abscess is seen.   Will treat with antibiotics and pain medication have him follow-up with a dentist.      Monique Humphreys MD  Attending Emergency  Physician              Cosmo Sweeney MD  05/13/21 2046

## 2022-01-12 ENCOUNTER — HOSPITAL ENCOUNTER (EMERGENCY)
Age: 32
Discharge: HOME | End: 2022-01-12
Attending: EMERGENCY MEDICINE

## 2022-01-12 VITALS
RESPIRATION RATE: 18 BRPM | DIASTOLIC BLOOD PRESSURE: 78 MMHG | SYSTOLIC BLOOD PRESSURE: 130 MMHG | OXYGEN SATURATION: 98 % | HEART RATE: 89 BPM | TEMPERATURE: 98.8 F

## 2022-01-12 DIAGNOSIS — B34.9 VIRAL ILLNESS: Primary | ICD-10-CM

## 2022-01-12 PROCEDURE — 99282 EMERGENCY DEPT VISIT SF MDM: CPT

## 2022-01-12 PROCEDURE — 6370000000 HC RX 637 (ALT 250 FOR IP): Performed by: STUDENT IN AN ORGANIZED HEALTH CARE EDUCATION/TRAINING PROGRAM

## 2022-01-12 RX ORDER — ONDANSETRON 4 MG/1
4 TABLET, ORALLY DISINTEGRATING ORAL EVERY 8 HOURS PRN
Qty: 15 TABLET | Refills: 0 | Status: SHIPPED | OUTPATIENT
Start: 2022-01-12

## 2022-01-12 RX ORDER — IBUPROFEN 800 MG/1
800 TABLET ORAL EVERY 6 HOURS PRN
Qty: 21 TABLET | Refills: 0 | Status: SHIPPED | OUTPATIENT
Start: 2022-01-12

## 2022-01-12 RX ORDER — ACETAMINOPHEN 500 MG
1000 TABLET ORAL 4 TIMES DAILY PRN
Qty: 60 TABLET | Refills: 0 | Status: SHIPPED | OUTPATIENT
Start: 2022-01-12

## 2022-01-12 RX ORDER — ONDANSETRON 4 MG/1
4 TABLET, FILM COATED ORAL ONCE
Status: COMPLETED | OUTPATIENT
Start: 2022-01-12 | End: 2022-01-12

## 2022-01-12 RX ADMIN — ONDANSETRON HYDROCHLORIDE 4 MG: 4 TABLET, FILM COATED ORAL at 13:10

## 2022-01-12 ASSESSMENT — ENCOUNTER SYMPTOMS
NAUSEA: 1
DIARRHEA: 0
SHORTNESS OF BREATH: 0
CHEST TIGHTNESS: 0
VOMITING: 0
EYES NEGATIVE: 1
TROUBLE SWALLOWING: 0
VOICE CHANGE: 0
COUGH: 0
ABDOMINAL PAIN: 0
ABDOMINAL DISTENTION: 0
APNEA: 0

## 2022-01-12 NOTE — ED PROVIDER NOTES
[address]  ED  Emergency Department Encounter  Emergency Medicine Resident     Pt Name: Tuan Sellers  NPB:[id number]  [place] 1990  Date of evaluation: 1/12/22  PCP:  No primary care provider on file. CHIEF COMPLAINT       Chief Complaint   Patient presents with    Fatigue       HISTORY OF PRESENT ILLNESS  (Location/Symptom, Timing/Onset, Context/Setting, Quality, Duration, ModifyingFactors, Severity.)      Tuan Sellers is a 27 y.o. male presents to the emergency department for evaluation secondary to him having 1 day of mild nausea, full body aches and chills, pins-and-needles type sensation in his legs and subjective fever at home. Patient states that he is been taking care of his girlfriend who had the exact same symptoms with began 3 days ago. Patient states that his girlfriend did get tested for COVID-19 and she was negative and that he has been the primary caregiver for her since getting the negative result. Patient states that he does have some chronic medical problems which he is unable to see her medical care for after being released from incarceration, but states that on the whole he has been a healthy individual    PAST MEDICAL / SURGICAL / SOCIAL /FAMILY HISTORY      has no past medical history on file. No other pertinent PMH on review with patient/guardian. has no past surgical history on file. No other pertinent PSH on review with patient/guardian.   Social History     Socioeconomic History    Marital status: Single     Spouse name: Not on file    Number of children: Not on file    Years of education: Not on file    Highest education level: Not on file   Occupational History    Not on file   Tobacco Use    Smoking status: Never Smoker    Smokeless tobacco: Never Used   Substance and Sexual Activity    Alcohol use: Not on file    Drug use: Not on file    Sexual activity: Not on file   Other Topics Concern    Not on file   Social History Narrative  Not on file     Social Determinants of Health     Financial Resource Strain:     Difficulty of Paying Living Expenses: Not on file   Food Insecurity:     Worried About Running Out of Food in the Last Year: Not on file    John of Food in the Last Year: Not on file   Transportation Needs:     Lack of Transportation (Medical): Not on file    Lack of Transportation (Non-Medical): Not on file   Physical Activity:     Days of Exercise per Week: Not on file    Minutes of Exercise per Session: Not on file   Stress:     Feeling of Stress : Not on file   Social Connections:     Frequency of Communication with Friends and Family: Not on file    Frequency of Social Gatherings with Friends and Family: Not on file    Attends Sikhism Services: Not on file    Active Member of [address] [place] [address] Aubrey or Organizations: Not on file    Attends Club or Organization Meetings: Not on file    Marital Status: Not on file   Intimate Partner Violence:     Fear of Current or Ex-Partner: Not on file    Emotionally Abused: Not on file    Physically Abused: Not on file    Sexually Abused: Not on file   Housing Stability:     Unable to Pay for Housing in the Last Year: Not on file    Number of [place] in the Last Year: Not on file    Unstable Housing in the Last Year: Not on file       I counseled the patient against using tobacco products. No family history on file. No other pertinent FamHx on review with patient/guardian. Allergies:  Patient has no known allergies. Home Medications:  Prior to Admission medications    Medication Sig Start Date End Date Taking?  Authorizing Provider   acetaminophen (TYLENOL) 500 MG tablet Take 2 tablets by mouth 4 times daily as needed for Pain 1/12/22  Yes Sandy Lee MD   ibuprofen (IBU) 800 MG tablet Take 1 tablet by mouth every 6 hours as needed for Pain 1/12/22  Yes Sandy Lee MD   ondansetron (ZOFRAN ODT) 4 MG disintegrating tablet Take 1 tablet by mouth every 8 hours as needed for Nausea 1/12/22  Yes Madison Marie MD   penicillin v potassium (VEETID) 500 MG tablet Take 1 tablet by mouth 4 times daily 5/13/21   Haley Eastman DO   benzocaine (ORAJEL) 10 % mucosal gel Take by mouth as needed. 5/13/21   Samantha Ellison,        REVIEW OF SYSTEMS    (2-9 systems for level 4, 10 ormore for level 5)      Review of Systems   Constitutional: Positive for activity change, chills, fatigue and fever. Negative for appetite change. HENT: Negative for congestion, trouble swallowing and voice change. Eyes: Negative. Respiratory: Negative for apnea, cough, chest tightness and shortness of breath. Cardiovascular: Negative for chest pain. Gastrointestinal: Positive for nausea. Negative for abdominal distention, abdominal pain, diarrhea and vomiting. Genitourinary: Negative. Musculoskeletal: Positive for myalgias. Skin: Negative. Neurological: Negative. Psychiatric/Behavioral: Negative. PHYSICAL EXAM   (up to 7 for level 4, 8 or more for level 5)      INITIAL VITALS:   /78   Pulse 89   Temp 98.8 °F (37.1 °C) (Oral)   Resp 18   SpO2 98%     Physical Exam  Constitutional:       General: He is not in acute distress. Appearance: Normal appearance. HENT:      Head: Normocephalic and atraumatic. Nose: Nose normal.      Mouth/Throat:      Mouth: Mucous membranes are moist.      Pharynx: Oropharynx is clear. Eyes:      Extraocular Movements: Extraocular movements intact. Conjunctiva/sclera: Conjunctivae normal.      Pupils: Pupils are equal, round, and reactive to light. Cardiovascular:      Rate and Rhythm: Normal rate and regular rhythm. Pulses: Normal pulses. Heart sounds: Normal heart sounds. Pulmonary:      Effort: Pulmonary effort is normal.      Breath sounds: Normal breath sounds. Abdominal:      General: Abdomen is flat. There is no distension. Tenderness: There is no abdominal tenderness.    Musculoskeletal: no further questions. RADIOLOGY:  No orders to display         EKG  None    All EKG's are interpreted by the Emergency Department Physician who either signs or Co-signs this chart in the absence of a cardiologist.      PROCEDURES:  None    CONSULTS:  None        FINAL IMPRESSION      1. Viral illness          DISPOSITION / PLAN       DISPOSITION Decision To Discharge 01/12/2022 12:52:20 PM        PATIENT REFERREDTO:  No follow-up provider specified.     DISCHARGE MEDICATIONS:  Current Discharge Medication List          Yaneth Monsalve MD  PGY 2  Resident Physician Emergency Medicine  01/12/22 12:52 PM        (Please note that portions of this note were completed with a voice recognition program.Efforts were made to edit the dictations but occasionally words are mis-transcribed.)       Yaneth Monsalve MD  Resident  01/12/22

## 2022-01-12 NOTE — ED PROVIDER NOTES
program.  Efforts were made to edit the dictations but occasionally words are mis-transcribed.)      Maite Jaquez MD,, MD  Attending Emergency Physician         Maite Jaquez MD  01/12/22 [phone]

## 2022-01-12 NOTE — ED NOTES
Bed: 42  Expected date:   Expected time:   Means of arrival:   Comments:     Cindy Reyes, CARLIE  01/12/22 0317

## 2022-09-12 ENCOUNTER — HOSPITAL ENCOUNTER (EMERGENCY)
Age: 32
Discharge: HOME | End: 2022-09-12
Attending: EMERGENCY MEDICINE

## 2022-09-12 ENCOUNTER — APPOINTMENT (OUTPATIENT)
Dept: GENERAL RADIOLOGY | Age: 32
End: 2022-09-12

## 2022-09-12 ENCOUNTER — APPOINTMENT (OUTPATIENT)
Dept: CT IMAGING | Age: 32
End: 2022-09-12

## 2022-09-12 VITALS
OXYGEN SATURATION: 97 % | SYSTOLIC BLOOD PRESSURE: 126 MMHG | TEMPERATURE: 98.8 F | HEART RATE: 67 BPM | RESPIRATION RATE: 18 BRPM | DIASTOLIC BLOOD PRESSURE: 72 MMHG

## 2022-09-12 DIAGNOSIS — S06.0X9A CONCUSSION WITH LOSS OF CONSCIOUSNESS, INITIAL ENCOUNTER: Primary | ICD-10-CM

## 2022-09-12 PROCEDURE — 73590 X-RAY EXAM OF LOWER LEG: CPT

## 2022-09-12 PROCEDURE — 6360000002 HC RX W HCPCS

## 2022-09-12 PROCEDURE — 99284 EMERGENCY DEPT VISIT MOD MDM: CPT

## 2022-09-12 PROCEDURE — 90715 TDAP VACCINE 7 YRS/> IM: CPT

## 2022-09-12 PROCEDURE — 90471 IMMUNIZATION ADMIN: CPT

## 2022-09-12 PROCEDURE — 73610 X-RAY EXAM OF ANKLE: CPT

## 2022-09-12 PROCEDURE — 6370000000 HC RX 637 (ALT 250 FOR IP)

## 2022-09-12 PROCEDURE — 70450 CT HEAD/BRAIN W/O DYE: CPT

## 2022-09-12 RX ORDER — IBUPROFEN 400 MG/1
400 TABLET ORAL EVERY 6 HOURS PRN
Qty: 120 TABLET | Refills: 1 | Status: SHIPPED | OUTPATIENT
Start: 2022-09-12

## 2022-09-12 RX ORDER — ACETAMINOPHEN 500 MG
1000 TABLET ORAL EVERY 6 HOURS PRN
Status: DISCONTINUED | OUTPATIENT
Start: 2022-09-12 | End: 2022-09-12 | Stop reason: HOSPADM

## 2022-09-12 RX ADMIN — ACETAMINOPHEN 1000 MG: 500 TABLET ORAL at 16:54

## 2022-09-12 RX ADMIN — TETANUS TOXOID, REDUCED DIPHTHERIA TOXOID AND ACELLULAR PERTUSSIS VACCINE, ADSORBED 0.5 ML: 5; 2.5; 8; 8; 2.5 SUSPENSION INTRAMUSCULAR at 16:55

## 2022-09-12 ASSESSMENT — ENCOUNTER SYMPTOMS
BACK PAIN: 1
ABDOMINAL PAIN: 0
RHINORRHEA: 0
TROUBLE SWALLOWING: 0
ABDOMINAL DISTENTION: 0
CHEST TIGHTNESS: 0
SHORTNESS OF BREATH: 0
EYE PAIN: 0
EYE DISCHARGE: 0
SINUS PRESSURE: 0

## 2022-09-12 ASSESSMENT — PAIN DESCRIPTION - FREQUENCY: FREQUENCY: INTERMITTENT

## 2022-09-12 ASSESSMENT — PAIN SCALES - GENERAL: PAINLEVEL_OUTOF10: 8

## 2022-09-12 ASSESSMENT — PAIN - FUNCTIONAL ASSESSMENT: PAIN_FUNCTIONAL_ASSESSMENT: 0-10

## 2022-09-12 ASSESSMENT — PAIN DESCRIPTION - LOCATION: LOCATION: HEAD
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
## 2022-09-12 NOTE — DISCHARGE INSTRUCTIONS
Take motrin every 6 hours as needed    Return to ER if symptoms worsen    Follow up with primary care physician
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
## 2022-11-30 ENCOUNTER — APPOINTMENT (OUTPATIENT)
Dept: GENERAL RADIOLOGY | Age: 32
End: 2022-11-30
Payer: MEDICAID

## 2022-11-30 ENCOUNTER — HOSPITAL ENCOUNTER (EMERGENCY)
Age: 32
Discharge: HOME | End: 2022-12-01
Attending: EMERGENCY MEDICINE
Payer: MEDICAID

## 2022-11-30 VITALS
BODY MASS INDEX: 23.86 KG/M2 | SYSTOLIC BLOOD PRESSURE: 115 MMHG | TEMPERATURE: 97.8 F | DIASTOLIC BLOOD PRESSURE: 76 MMHG | HEIGHT: 73 IN | RESPIRATION RATE: 16 BRPM | HEART RATE: 83 BPM | WEIGHT: 180 LBS | OXYGEN SATURATION: 98 %

## 2022-11-30 DIAGNOSIS — S60.222A CONTUSION OF LEFT HAND, INITIAL ENCOUNTER: Primary | ICD-10-CM

## 2022-11-30 PROCEDURE — 6370000000 HC RX 637 (ALT 250 FOR IP): Performed by: STUDENT IN AN ORGANIZED HEALTH CARE EDUCATION/TRAINING PROGRAM

## 2022-11-30 PROCEDURE — 73110 X-RAY EXAM OF WRIST: CPT

## 2022-11-30 PROCEDURE — 73130 X-RAY EXAM OF HAND: CPT

## 2022-11-30 PROCEDURE — 99283 EMERGENCY DEPT VISIT LOW MDM: CPT

## 2022-11-30 RX ORDER — ACETAMINOPHEN 500 MG
1000 TABLET ORAL ONCE
Status: COMPLETED | OUTPATIENT
Start: 2022-11-30 | End: 2022-11-30

## 2022-11-30 RX ADMIN — ACETAMINOPHEN 1000 MG: 500 TABLET ORAL at 23:42

## 2022-11-30 ASSESSMENT — PAIN DESCRIPTION - PAIN TYPE: TYPE: ACUTE PAIN

## 2022-11-30 ASSESSMENT — PAIN DESCRIPTION - ORIENTATION: ORIENTATION: LEFT

## 2022-11-30 ASSESSMENT — PAIN - FUNCTIONAL ASSESSMENT: PAIN_FUNCTIONAL_ASSESSMENT: 0-10

## 2022-11-30 ASSESSMENT — PAIN DESCRIPTION - LOCATION: LOCATION: HAND

## 2022-11-30 ASSESSMENT — PAIN SCALES - GENERAL: PAINLEVEL_OUTOF10: 10

## 2022-11-30 ASSESSMENT — PAIN DESCRIPTION - DESCRIPTORS: DESCRIPTORS: THROBBING

## 2022-12-01 PROCEDURE — 6370000000 HC RX 637 (ALT 250 FOR IP): Performed by: STUDENT IN AN ORGANIZED HEALTH CARE EDUCATION/TRAINING PROGRAM

## 2022-12-01 RX ORDER — IBUPROFEN 800 MG/1
800 TABLET ORAL ONCE
Status: COMPLETED | OUTPATIENT
Start: 2022-12-01 | End: 2022-12-01

## 2022-12-01 RX ORDER — ACETAMINOPHEN 500 MG
500 TABLET ORAL EVERY 4 HOURS PRN
Qty: 60 TABLET | Refills: 0 | Status: SHIPPED | OUTPATIENT
Start: 2022-12-01

## 2022-12-01 RX ORDER — IBUPROFEN 800 MG/1
800 TABLET ORAL EVERY 8 HOURS PRN
Qty: 21 TABLET | Refills: 0 | Status: SHIPPED | OUTPATIENT
Start: 2022-12-01 | End: 2022-12-08

## 2022-12-01 RX ADMIN — IBUPROFEN 800 MG: 800 TABLET, FILM COATED ORAL at 01:12

## 2022-12-01 ASSESSMENT — ENCOUNTER SYMPTOMS
RESPIRATORY NEGATIVE: 1
GASTROINTESTINAL NEGATIVE: 1
EYES NEGATIVE: 1

## 2022-12-01 NOTE — DISCHARGE INSTRUCTIONS
Take your medication as indicated and prescribed. For pain use ibuprofen (Motrin / Advil) or acetaminophen (Tylenol), unless prescribed medications that have acetaminophen in it. You can take over the counter acetaminophen tablets (1 - 2 tablets of the 500-mg strength every 6 hours) or ibuprofen tablets (2 tablets every 4 hours). Wear the splint as needed for pain. Keep your hand elevated above your heart as much as possible to help reduce swelling. PLEASE RETURN TO THE EMERGENCY DEPARTMENT IMMEDIATELY for worsening symptoms, pain not controlled with the prescribed / over the counter pain medication, numbness or tingling in your hands, unable to lift your wrist up, or if you develop any concerning symptoms such as: high fever not relieved by acetaminophen (Tylenol) and/or ibuprofen (Motrin / Advil), chills, shortness of breath, chest pain, feeling of your heart fluttering or racing, persistent nausea and/or vomiting, vomiting up blood, blood in your stool, numbness, loss of consciousness, weakness or tingling in the arms or legs or change in color of the extremities, changes in mental status, persistent headache, blurry vision, loss of bladder / bowel control, unable to follow up with your physician, or other any other care or concern.

## 2022-12-01 NOTE — ED PROVIDER NOTES
[facility]     Emergency Department     Faculty Note/ Attestation      Pt Name: Yunior Gordon                                       MRN: [id number]  [place] 1990  Date of evaluation: 11/30/2022    Patients PCP:    No primary care provider on file. Attestation  I performed a history and physical examination of the patient and discussed management with the resident. I reviewed the residents note and agree with the documented findings and plan of care. Any areas of disagreement are noted on the chart. I was personally present for the key portions of any procedures. I have documented in the chart those procedures where I was not present during the key portions. I have reviewed the emergency nurses triage note. I agree with the chief complaint, past medical history, past surgical history, allergies, medications, social and family history as documented unless otherwise noted below. For Physician Assistant/ Nurse Practitioner cases/documentation I have personally evaluated this patient and have completed at least one if not all key elements of the E/M (history, physical exam, and MDM). Additional findings are as noted.       Initial Screens:             Vitals:    Vitals:    11/30/22 2320   BP: 115/76   Pulse: 83   Resp: 16   Temp: 97.8 °F (36.6 °C)   TempSrc: Oral   SpO2: 98%   Weight: 180 lb (81.6 kg)   Height: 6' 1\" (1.854 m)       CHIEF COMPLAINT       Chief Complaint   Patient presents with    Hand Pain     Smashed in lawrence of car yesterday             DIAGNOSTIC RESULTS             RADIOLOGY:   No orders to display         LABS:  Labs Reviewed - No data to display      EMERGENCY DEPARTMENT COURSE:     -------------------------  BP: 115/76, Temp: 97.8 °F (36.6 °C), Heart Rate: 83, Resp: 16      Comments    33 yo with hand pain on L  Dripped car lawrence on hand yesterday, today having worsening pain and swelling  No wrist pain    Plan for XR, pain control        (Please note that portions of this note were completed with a voice recognition program.  Efforts were made to edit the dictations but occasionally words are mis-transcribed.)      Anuel Bar MD,, MD  Attending Emergency Physician          Anuel Bar MD  12/03/22 1590

## 2022-12-01 NOTE — ED PROVIDER NOTES
[facility]  ED  Emergency Department Encounter  Emergency Medicine Resident     Pt Name:Jad Hill  MRN: [id number]  [place] 1990  Date of evaluation: 11/30/22  PCP:  No primary care provider on file. CHIEF COMPLAINT       Chief Complaint   Patient presents with    Hand Pain     Smashed in lawrence of car yesterday       HISTORY OF PRESENT ILLNESS  (Location/Symptom, Timing/Onset, Context/Setting, Quality, Duration, Modifying Factors, Severity.)      Madi Greenberg is a 34 y.o. male who presents with pain in his left hand. Patient reports yesterday he was working on a car when the lawrence fell on his left hand. Patient reports no immediate pain in the hand but reports today around 3 PM the pain started increasing in the left hand. Patient also reports swelling to the back of the left hand in addition to bruising. Patient reports the pain and swelling is worse on the lateral side of the hand. Patient reports he can still move his thumb and first finger without any pain but the other 3 fingers are difficult to move due to pain. Patient denies any wrist pain or elbow pain on that side. Patient also denies any laceration or wound to the hand. Patient denies taking any medication for the pain, reports he tried to drink a couple beers today to deal with the pain. Patient denies any loss of sensation in the fingertips. PAST MEDICAL / SURGICAL / SOCIAL / FAMILY HISTORY      has no past medical history on file. has a past surgical history that includes other surgical history (Bilateral, 1/15/15).       Social History     Socioeconomic History    Marital status: Single     Spouse name: Not on file    Number of children: Not on file    Years of education: Not on file    Highest education level: Not on file   Occupational History    Not on file   Tobacco Use    Smoking status: Every Day     Packs/day: 0.50     Types: Cigarettes    Smokeless tobacco: Never   Substance and Sexual Activity    Alcohol use: Yes    Drug use: Yes     Types: Marijuana [place])    Sexual activity: Not on file   Other Topics Concern    Not on file   Social History Narrative    ** Merged History Encounter **          Social Determinants of Health     Financial Resource Strain: Not on file   Food Insecurity: Not on file   Transportation Needs: Not on file   Physical Activity: Not on file   Stress: Not on file   Social Connections: Not on file   Intimate Partner Violence: Not on file   Housing Stability: Not on file       History reviewed. No pertinent family history. Allergies:  Patient has no known allergies. Home Medications:  Prior to Admission medications    Medication Sig Start Date End Date Taking? Authorizing Provider   acetaminophen (TYLENOL) 500 MG tablet Take 1 tablet by mouth every 4 hours as needed for Pain 12/1/22  Yes Jyoti Guzman MD   ibuprofen (IBU) 800 MG tablet Take 1 tablet by mouth every 8 hours as needed for Pain 12/1/22 12/8/22 Yes Jyoti Guzman MD   ondansetron (ZOFRAN ODT) 4 MG disintegrating tablet Take 1 tablet by mouth every 8 hours as needed for Nausea 1/12/22   Shakira Black MD   penicillin v potassium (VEETID) 500 MG tablet Take 1 tablet by mouth 4 times daily 5/13/21   Edmar Eastman DO   benzocaine (ORAJEL) 10 % mucosal gel Take by mouth as needed. 5/13/21   Bryce Silveira DO       REVIEW OF SYSTEMS    (2-9 systems for level 4, 10 or more for level 5)      Review of Systems   Constitutional: Negative. HENT: Negative. Eyes: Negative. Respiratory: Negative. Cardiovascular: Negative. Gastrointestinal: Negative. Genitourinary: Negative. Musculoskeletal:  Positive for joint swelling. Skin:  Negative for wound. Neurological:  Negative for weakness and numbness.      PHYSICAL EXAM   (up to 7 for level 4, 8 or more for level 5)      INITIAL VITALS:   /76   Pulse 83   Temp 97.8 °F (36.6 °C) (Oral)   Resp 16   Ht 6' 1\" (1.854 m)   Wt 180 lb (81.6 kg)   SpO2 98%   BMI 23.75 kg/m²     Physical Exam  Vitals reviewed. Constitutional:       General: He is not in acute distress. Appearance: Normal appearance. He is normal weight. He is not ill-appearing. HENT:      Head: Normocephalic and atraumatic. Right Ear: External ear normal.      Left Ear: External ear normal.      Nose: Nose normal.      Mouth/Throat:      Mouth: Mucous membranes are moist.   Eyes:      Extraocular Movements: Extraocular movements intact. Conjunctiva/sclera: Conjunctivae normal.      Pupils: Pupils are equal, round, and reactive to light. Cardiovascular:      Rate and Rhythm: Normal rate and regular rhythm. Heart sounds: Normal heart sounds. Pulmonary:      Effort: Pulmonary effort is normal. No respiratory distress. Breath sounds: Normal breath sounds. Abdominal:      General: Abdomen is flat. Palpations: Abdomen is soft. Tenderness: There is no abdominal tenderness. Musculoskeletal:         General: Swelling, tenderness and signs of injury present. Left hand: Swelling, tenderness and bony tenderness present. No deformity or lacerations. Decreased range of motion. Decreased strength of finger abduction. Normal strength of thumb/finger opposition and wrist extension. Normal sensation. Normal capillary refill. Normal pulse. Comments: No snuffbox tenderness   Skin:     General: Skin is warm and dry. Findings: Bruising present. No erythema. Neurological:      General: No focal deficit present. Mental Status: He is alert and oriented to person, place, and time. Motor: No weakness.        DIFFERENTIAL  DIAGNOSIS     PLAN (LABS / IMAGING / EKG):  Orders Placed This Encounter   Procedures    XR HAND LEFT (MIN 3 VIEWS)    XR WRIST LEFT (MIN 3 VIEWS)       MEDICATIONS ORDERED:  Orders Placed This Encounter   Medications    acetaminophen (TYLENOL) tablet 1,000 mg    ibuprofen (ADVIL;MOTRIN) tablet 800 mg acetaminophen (TYLENOL) 500 MG tablet     Sig: Take 1 tablet by mouth every 4 hours as needed for Pain     Dispense:  60 tablet     Refill:  0    ibuprofen (IBU) 800 MG tablet     Sig: Take 1 tablet by mouth every 8 hours as needed for Pain     Dispense:  21 tablet     Refill:  0       DDX: Fracture, contusion, neuropathy    DIAGNOSTIC RESULTS / EMERGENCY DEPARTMENT COURSE / MDM   LAB RESULTS:  No results found for this visit on 11/30/22. IMPRESSION: Patient is a 28-year-old man who is presenting due to injury of his left hand. Patient is reporting difficulty with motion of lateral 2 fingers due to pain in the hand on the lateral side. Patient denying any snuffbox tenderness and has full range of motion of the first 2 fingers. Patient is neurovascularly intact over all fingers. X-rays show no acute fracture suggesting that patient likely sustained a contusion to the hand. RADIOLOGY:  XR HAND LEFT (MIN 3 VIEWS)   Final Result   No acute osseous abnormality. XR WRIST LEFT (MIN 3 VIEWS)   Final Result   No acute osseous abnormality. EMERGENCY DEPARTMENT COURSE:      ED Course as of 12/01/22 0523 Wed Nov 30, 2022   2346 Patient evaluated reporting left hand pain after dropping the lawrence of a car on it yesterday. Reports worsening pain today and difficulty with motion of lateral fingers. Strays ordered x-rays of hand and wrist ordered, Tylenol given, ice pack administered. [HO]   u Dec 01, 2022   8053 X-ray of left hand and wrist shows no acute fracture  [HO]      ED Course User Claudette Mckeon MD       No notes of  Admission Criteria type on file. PROCEDURES:  None    CONSULTS:  None    CRITICAL CARE:  See attending note    FINAL IMPRESSION      1.  Contusion of left hand, initial encounter          DISPOSITION / PLAN     DISPOSITION Decision To Discharge 12/01/2022 12:47:23 AM      PATIENT REFERRED TO:  [facility] ED  [address] [address]  [phone]  Go to   As needed, If symptoms worsen    DISCHARGE MEDICATIONS:  Discharge Medication List as of 12/1/2022 12:51 Jossie Skaggs MD  Emergency Medicine Resident    (Please note that portions of thisnote were completed with a voice recognition program.  Efforts were made to edit the dictations but occasionally words are mis-transcribed.)       Milena Chacko MD  Resident  12/01/22 3546

## 2022-12-01 NOTE — ED NOTES
Pt presents to the ED via ambulatory to room with complaint sof left hand pain due to smashing it under a car lawrence from falling on it   pt states he did this yesterday  Pt states pain 10/10   pts PMS intact   Swelling noted to hand, cap refill +2  Pt alert and oriented x4, talking in complete sentences, respirations even and unlabored. Pt acting age appropriate.  White board updated, will continue to plan of care        Dasia Harden RN  11/30/22 5050

## 2023-03-29 NOTE — ED PROVIDER NOTES
nausea and vomiting. Negative for abdominal pain, constipation and diarrhea. Genitourinary: Negative for decreased urine volume, difficulty urinating, flank pain, frequency and hematuria. Musculoskeletal: Negative for arthralgias, back pain and neck pain. Skin: Negative for rash. Neurological: Negative for dizziness, weakness, numbness and headaches. All other systems reviewed and are negative. PHYSICAL EXAM   (up to 7 for level 4, 8 or more forlevel 5)      INITIAL VITALS:   Vitals:    03/11/21 0115   BP: 103/66   Pulse: 70   Resp: 16   Temp: 97.2 °F (36.2 °C)   SpO2: 99%        Physical Exam  Vitals signs and nursing note reviewed. Constitutional:       General: He is not in acute distress. Appearance: Normal appearance. He is not toxic-appearing. Comments: Adult male lying in bed in no acute distress. He speaks in full sentences and answers questions appropriately. He appears mildly uncomfortable. HENT:      Head: Normocephalic and atraumatic. Right Ear: External ear normal.      Left Ear: External ear normal.      Nose: Nose normal. No congestion or rhinorrhea. Mouth/Throat:      Mouth: Mucous membranes are dry. Pharynx: Oropharynx is clear. No oropharyngeal exudate or posterior oropharyngeal erythema. Eyes:      Conjunctiva/sclera: Conjunctivae normal.   Neck:      Musculoskeletal: Normal range of motion and neck supple. Cardiovascular:      Rate and Rhythm: Normal rate and regular rhythm. Heart sounds: No murmur. No friction rub. No gallop. Pulmonary:      Effort: Pulmonary effort is normal. No respiratory distress. Breath sounds: Normal breath sounds. No stridor. No wheezing, rhonchi or rales. Abdominal:      General: Abdomen is flat. There is no distension. Palpations: Abdomen is soft. There is no mass. Tenderness: There is no abdominal tenderness. There is no guarding. Musculoskeletal: Normal range of motion.          General: No 102.9 fL    MCH 19.8 (L) 25.2 - 33.5 pg    MCHC 31.1 28.4 - 34.8 g/dL    RDW 19.4 (H) 11.8 - 14.4 %    Platelets See Reflexed IPF Result 138 - 453 k/uL    MPV NOT REPORTED 8.1 - 13.5 fL    NRBC Automated 0.2 (H) 0.0 per 100 WBC    Differential Type NOT REPORTED     WBC Morphology NOT REPORTED     RBC Morphology NOT REPORTED     Platelet Estimate NOT REPORTED     Immature Granulocytes 1 (H) 0 %    Seg Neutrophils 51 36 - 66 %    Lymphocytes 39 24 - 44 %    Monocytes 7 1 - 7 %    Eosinophils % 0 (L) 1 - 4 %    Basophils 2 0 - 2 %    Absolute Immature Granulocyte 0.09 0.00 - 0.30 k/uL    Segs Absolute 4.59 1.8 - 7.7 k/uL    Absolute Lymph # 3.51 1.0 - 4.8 k/uL    Absolute Mono # 0.63 0.1 - 0.8 k/uL    Absolute Eos # 0.00 0.0 - 0.4 k/uL    Basophils Absolute 0.18 0.0 - 0.2 k/uL    Morphology ANISOCYTOSIS PRESENT     Morphology MICROCYTOSIS PRESENT     Morphology 1+ ELLIPTOCYTES     Morphology 1+ POLYCHROMASIA    Lipase   Result Value Ref Range    Lipase 183 (H) 13 - 60 U/L   Immature Platelet Fraction   Result Value Ref Range    Platelet, Immature Fraction 7.6 1.1 - 10.3 %    Platelet, Fluorescence 307 138 - 453 k/uL         RADIOLOGY:  None      EKG  None    MEDICATIONS ORDERED:  Orders Placed This Encounter   Medications    0.9 % sodium chloride bolus    ondansetron (ZOFRAN) injection 4 mg    ketorolac (TORADOL) injection 30 mg    0.9 % sodium chloride bolus    sodium chloride flush 0.9 % injection 10 mL    sodium chloride flush 0.9 % injection 10 mL    enoxaparin (LOVENOX) injection 40 mg    acetaminophen (TYLENOL) tablet 1,000 mg    morphine (PF) injection 2 mg    OR Linked Order Group     oxyCODONE (ROXICODONE) immediate release tablet 5 mg     oxyCODONE (ROXICODONE) immediate release tablet 10 mg    ondansetron (ZOFRAN) injection 4 mg    lactated ringers 1,000 mL infusion         PROCEDURES:  None      CONSULTS:  None      EMERGENCY DEPARTMENT COURSE:  ED Course as of Mar 11 0219   Wed Mar 10, 2021 2259 WBC: 9.0 [KA]     4307 Basic Metabolic Panel w/ Reflex to MG(!):    Glucose 85   BUN 12   Creatinine 0.62(!)   Bun/Cre Ratio NOT REPORTED   Calcium 8.5(!)   Sodium 141   Potassium 3.8   Chloride 107   CO2 24   Anion Gap 10    [KA]     2300 Lipase(!): 183     Patient's work-up is remarkable for mild pancreatitis. He was reexamined and still has no tenderness to palpation of his abdomen. Suspect patient's vomiting has caused his pancreatitis and not the other way around, however will add hepatic panel to evaluate. Patient denies chronic alcohol use. He denies a history of pancreatitis. Given that patient states he does not feel improved with IV fluids, Zofran and Toradol and does not feel like he can tolerate p.o., will admit to observation unit for IV fluid hydration, slow advancement of diet and pain control. [KA]      ED Course User Index  [KA] Pastor Deedee DO          FINAL IMPRESSION      1. Acute pancreatitis without infection or necrosis, unspecified pancreatitis type          DISPOSITION / PLAN     DISPOSITION Admitted 03/10/2021 11:31:58 PM      PATIENT REFERRED TO:  No follow-up provider specified. DISCHARGE MEDICATIONS:  There are no discharge medications for this patient.       Pastor Deedee DO  Emergency Medicine Resident    (Please note that portions of this note were completed with a voice recognition program.Efforts were made to edit the dictations but occasionally words are mis-transcribed.)        Pastor Deedee DO  Resident  03/11/21 [id number] negative